# Patient Record
(demographics unavailable — no encounter records)

---

## 2025-03-12 NOTE — HISTORY OF PRESENT ILLNESS
[FreeTextEntry1] : Mr. ARLENE VILLEGAS comes in today to discuss recent UTIs.   Previously saw Dr. Mohamud for routine follow up. No bothersome LUTS.   He had a UTI on labor day with epididymitis. Was in the hospital at [facility] for IV antibiotics and discharged with oral antibiotics. At that time, he indicates he had a lot of imaging, including CT scan and ultrasounds that were reportedly unremarkable.  He had recurrence of epididymitis on the other side that was treated successfully with oral antibiotics. He then had another more recent recurrence with primarily foul smelling urine and frequency. Found to have E coli UTI. Treated with bactrim x 2 weeks. No difficulty urinating, stream is good, emptying completely. He does not take medications for urination normally.   PVR 12 cc  PSA in setting of infection increased to 3.7. Prior PSA were much lower.  PSAs:  10/2023--0.73; 4/1/21--0.37; 11/13/19--0.33

## 2025-03-12 NOTE — HISTORY OF PRESENT ILLNESS
[FreeTextEntry1] : Mr. ARLENE VILLEGAS comes in today to discuss recent UTIs.   Previously saw Dr. Mohamud for routine follow up. No bothersome LUTS.   He had a UTI on labor day with epididymitis. Was in the hospital at [facility] for IV antibiotics and discharged with oral antibiotics. At that time, he indicates he had a lot of imaging, including CT scan and ultrasounds that were reportedly unremarkable.  He had recurrence of epididymitis on the other side that was treated successfully with oral antibiotics. He then had another more recent recurrence with primarily foul smelling urine and frequency. Found to have E coli UTI. Treated with bactrim x 2 weeks. No difficulty urinating, stream is good, emptying completely. He does not take medications for urination normally.   PVR 12 cc  PSA in setting of infection increased to 3.7. Prior PSA were much lower.  PSAs:  10/2023--0.73; 4/1/21--0.37; 11/13/19--0.33

## 2025-03-12 NOTE — ASSESSMENT
[FreeTextEntry1] : 63 year old man presents with 3 UTIs in the last 6 months, two episodes of epididymitis and one cystitis. We discussed that recurrent UTIs in men may be an indication of an underlying voiding dysfunction. We discussed potential benefit from an alpha blocker to help with emptying and lower voiding pressures. He would like to monitor for now. We discussed that recurrent UTIs may indicate some degree of prostatitis, with bacteria being more difficult to clear from the prostate. Occasionally a more prolonged course of antibiotics is needed to fully treat. For now, he would like to monitor and treat for recurrence.   His PSA was recently more elevated than baseline, likely in setting of infection or possible prostatitis. Recommend repeating the PSA in 4 weeks.  - UA, UCx  - PSA in 4 weeks  - Consider flomax  - If he develops recurrent symptoms, repeat UA/UCx and consider longer course of antibiotics for possible prostatitis.  - He indicates he had upper tract imaging at [facility] that was normal when admitted to hospital, so obstruction or stones less likely

## 2025-03-12 NOTE — ASSESSMENT
[FreeTextEntry1] : 63 year old man presents with 3 UTIs in the last 6 months, two episodes of epididymitis and one cystitis. We discussed that recurrent UTIs in men may be an indication of an underlying voiding dysfunction. We discussed potential benefit from an alpha blocker to help with emptying and lower voiding pressures. He would like to monitor for now. We discussed that recurrent UTIs may indicate some degree of prostatitis, with bacteria being more difficult to clear from the prostate. Occasionally a more prolonged course of antibiotics is needed to fully treat. For now, he would like to monitor and treat for recurrence.   His PSA was recently more elevated than baseline, likely in setting of infection or possible prostatitis. Recommend repeating the PSA in 4 weeks.  - UA, UCx  - PSA in 4 weeks  - Consider flomax  - If he develops recurrent symptoms, repeat UA/UCx and consider longer course of antibiotics for possible prostatitis.  - He indicates he had upper tract imaging at [facility] that was normal when admitted to hospital, so obstruction or stones less likely

## 2025-03-12 NOTE — LETTER BODY
[Dear  ___] : Dear  [unfilled], [Courtesy Letter:] : I had the pleasure of seeing your patient, [unfilled], in my office today. [Please see my note below.] : Please see my note below. [Consult Closing:] : Thank you very much for allowing me to participate in the care of this patient.  If you have any questions, please do not hesitate to contact me. [Sincerely,] : Sincerely, [FreeTextEntry3] : Madeline Angel MD

## 2025-07-30 NOTE — END OF VISIT
[FreeTextEntry3] :   I, Dr. Angel, personally performed the evaluation and management (E/M) services for this established patient who presents today with an exacerbation of an existing condition. That E/M includes conducting the examination, assessing all exacerbated conditions, and establishing a plan of care.  Today, my ACP, Marce Cerna, was here to observe my evaluation and management services for this exacerbated condition to be followed going forward.  [Time Spent: ___ minutes] : I have spent [unfilled] minutes of time on the encounter which excludes teaching and separately reported services.

## 2025-07-30 NOTE — ASSESSMENT
[FreeTextEntry1] : 63 year old man presents with 3 UTIs in the last 6 months, two episodes of epididymitis and one cystitis. We discussed that recurrent UTIs in men may be an indication of an underlying voiding dysfunction. We discussed potential benefit from an alpha blocker to help with emptying and lower voiding pressures. He would like to monitor for now. We discussed that recurrent UTIs may indicate some degree of prostatitis, with bacteria being more difficult to clear from the prostate. Occasionally a more prolonged course of antibiotics is needed to fully treat. For now, he would like to monitor and treat for recurrence.   His PSA was recently more elevated than baseline, likely in setting of infection or possible prostatitis. Recommend repeating the PSA in 4 weeks. Will delay repeat at this time due to another infection. Discussed repeating Ucx today with high sensitivity urine culture. If positive, would order CT for further evaluation. Discussed tamsulosin again, he would like to hold off.   - Special urine culture - If + would recommend CT  - Consider flomax

## 2025-07-30 NOTE — HISTORY OF PRESENT ILLNESS
[FreeTextEntry1] : Mr. ARLENE VILLEGAS comes in today to discuss recent UTIs.   Previously saw Dr. Mohamud for routine follow up. No bothersome LUTS.   He had a UTI on labor day with epididymitis. Was in the hospital at [facility] for IV antibiotics and discharged with oral antibiotics. At that time, he indicates he had a lot of imaging, including CT scan and ultrasounds that were reportedly unremarkable.  He had recurrence of epididymitis on the other side that was treated successfully with oral antibiotics. He then had another more recent recurrence with primarily foul smelling urine and frequency. Found to have E coli UTI. Treated with bactrim x 2 weeks. No difficulty urinating, stream is good, emptying completely. He does not take medications for urination normally.   PVR 12 cc  PSA in setting of infection increased to 3.7. Prior PSA were much lower.  PSAs:  10/2023--0.73; 4/1/21--0.37; 11/13/19--0.33  7/30/25:  Patient called early July. Had UTI and treated with bactrim x10 days. He did a NELIDA which culture was negative, but urinalysis showed 417 WBCs. Generally feeling well now. LUTS back to baseline level.